# Patient Record
Sex: FEMALE | Race: WHITE
[De-identification: names, ages, dates, MRNs, and addresses within clinical notes are randomized per-mention and may not be internally consistent; named-entity substitution may affect disease eponyms.]

---

## 2017-12-28 ENCOUNTER — HOSPITAL ENCOUNTER (EMERGENCY)
Dept: HOSPITAL 17 - HOBED | Age: 22
Discharge: HOME | End: 2017-12-28
Payer: COMMERCIAL

## 2017-12-28 VITALS
TEMPERATURE: 99.2 F | OXYGEN SATURATION: 100 % | DIASTOLIC BLOOD PRESSURE: 67 MMHG | HEART RATE: 97 BPM | RESPIRATION RATE: 16 BRPM | SYSTOLIC BLOOD PRESSURE: 141 MMHG

## 2017-12-28 DIAGNOSIS — R10.30: ICD-10-CM

## 2017-12-28 DIAGNOSIS — Z3A.16: ICD-10-CM

## 2017-12-28 DIAGNOSIS — O26.892: Primary | ICD-10-CM

## 2017-12-28 PROCEDURE — 84112 EVAL AMNIOTIC FLUID PROTEIN: CPT

## 2017-12-28 PROCEDURE — 76815 OB US LIMITED FETUS(S): CPT

## 2017-12-28 NOTE — PD
HPI


Chief Complaint


Leakage of fluid and cramping abdominal pain


Date Seen:  Dec 28, 2017


Time Seen:  22:05


Travel History


International Travel<30 Days:  No


Contact w/Intl Traveler<30Days:  No


Known Affected Area:  No





History of Present Illness


HPI


Patient is 22-year-old white female  at 16 weeks who presents to the 

emergency room with complaining of loss of fluid per vagina and cramping pain.  

Denies bleeding.  Amnio sure is negative ,Fetal heart tones 140s


Weeks Gestation:  16


Para:  0


:  1





History


Social History


Alcohol Use:  No


Tobacco Use:  No


Substance Abuse:  No





Allergies-Medications


(Allergen,Severity, Reaction):  


Coded Allergies:  


     Iodinated Contrast- Oral and IV Dye (Unverified  Allergy, Intermediate, 

RASH, 17)


     caffeine (Unverified  Allergy, Intermediate, PSORIASIS / BREAKOUTS., )


     latex (Unverified  Allergy, Mild, rash, 17)


     tomato (Unverified  Adverse Reaction, Unknown, 17)


Home Meds


No Active Prescriptions or Reported Meds





Review of Systems


General / Constitutional:  No: Fever, Weight Gain, Chills, Other


Eyes:  No: Diploplia, Blurred Vision, Visual changes, Pain, Photophobia


HENT:  No: Headaches, Vertigo, Lightheadedness


Cardiovascular:  No: Irregular Rhythm, Chest Pain or Discomfort, Palpitations, 

Tachycardia, Syncope, Varicosities, Edema, Cyanosis


Respiratory:  No: Cough, Short of Breath, Other


Gastrointestinal:  No: Nausea, Vomiting, Diarrhea


Genitourinary:  No: Decreased Urinary Output, Oliguria


Musculoskeletal:  No: Limited ROM, Weakness, Cramping, Edema, Pain


Skin:  No Rash, No Itching, No Dryness, No Lumps, No Change in Pigmentation, No 

Change in Nails, No Alopecia, No Lesions


Neurologic:  No: Weakness, Dizziness, Syncope, Focal Abnormalities, 

Coordination Problem, Headache, Slurred Speech, Seizures


Psychiatric:  No: Depression, Suicidal Ideations, Homicidal Ideation


Endocrine:  No: Heat Intolerance, Cold Intolerance, Polydipsia, Polyuria, Other





Physical Exam





Vital Signs








  Date Time  Temp Pulse Resp B/P (MAP) Pulse Ox O2 Delivery O2 Flow Rate FiO2


 


17 19:35 99.2 97 16 141/67 (91) 100 Room Air  








Narrative


GENERAL: Well-nourished, well-developed patient.


SKIN: Warm and dry.


HEAD: Normocephalic and atraumatic.


EYES: No scleral icterus. No injection or drainage. 


ENT: No nasal drainage noted. Mucous membranes pink. Airway patent.


NECK: Supple, trachea midline. No JVD.


CARDIOVASCULAR: Regular rate and rhythm without murmurs, gallops, or rubs. 


RESPIRATORY: Breath sounds equal bilaterally. No accessory muscle use.


BREASTS: Bilateral exam showed no masses , no retractions, no nipple discharge.


ABDOMEN/GI: Abdomen soft, non-tender, bowel sounds present, no rebound, no 

guarding 


   Gravid to [16-] weeks size


   Fundal Height: [-below umb]


GENITOURINARY: 


   External Genitalia: intact and normal in appearance


   BUS glands: [-]


   Cervix: [-post]


   Dilatation: [0-]          


   Effacement: [0-]          


   Station: [-2]  


   Presentation: [br-]        


   Membranes: [intact  amnisure neg


   Uterine Contractions: [none-]


FHT's:140s 


     


EXTREMITIES: No cyanosis or edema.


BACK: Nontender without obvious deformity. No CVA tenderness.


NEUROLOGICAL: Awake and alert. Motor and sensory grossly within normal limits. 

Five out of 5 muscle strength in all muscle groups. Normal speech.





Data


Data


Orders





 Orders


Ob Poc Ultrasound (17 )


Labs


US - 15-16 wk IUP, + CM  , normal AFV, breech , ant placenta





MDM


Interpretation(s)


22-year-old white female  at 16 weeks who initially thought she leak some 

fluid small amount per vagina and some cramping pain.  Here on OB ED she is not 

leaking fluid her cervix is closed and high ultrasound shows normal amniotic 

fluid on and a breech fetus is 15-16 week size, her amnio sure is negative here 

on OB ED


Plan


Past discharge patient home to increase bed rest, Tylenol usage for pain, 

increase her by mouth fluids, and she is a heating pad on low for comfort if it 

would help


Diagnosis


Diagnosis:  


 Primary Impression:  


 Abdominal pain affecting pregnancy


 Additional Impressions:  


 No leakage of amniotic fluid into vagina


 16 weeks gestation of pregnancy


Disposition:  01 DISCHARGE HOME


Condition:  Stable


Scripts


No Active Prescriptions or Reported Meds





**Additional Instructions**:  


Patient is transferred to the OB ED for further evaluation.











Davion Felder II, MD Dec 28, 2017 22:16

## 2017-12-28 NOTE — PD
HPI


Chief Complaint:  Pregnancy Related Problem


Time Seen by Provider:  20:55


Travel History


International Travel<30 days:  No


Contact w/Intl Traveler<30days:  No


Traveled to known affect area:  No





History of Present Illness


HPI


22-year-old female presents to the emergency department for evaluation of lower 

abdominal cramping that started 3 days ago.  She states she was seen at [facility] yesterday and had lab work as well as UA completed.  She is 

placed on Keflex for UTI.  However, she states he never checked the baby.  

Patient states she has small leakage of fluid today.  She reports being Rh-.  

She has no vaginal bleeding.  She denies any abnormal vaginal discharge risk of 

STDs.  No fevers or chills.  Her chest pain or shortness breath.  She is a G3, 

P1 with one previous miscarriage and one live birth.  She states her last 

menstrual cycle was 2017.  She reports being approximately 16 weeks.

  Patient states [facility].  

Moderate severity.  No exacerbating or alleviating factors.





PFSH


Past Medical History


Medical History:  Denies Significant Hx


Diminished Hearing:  No


Immunizations Current:  Yes


Tetanus Vaccination:  < 5 Years


Pregnant?:  Pregnant


:  2


Para:  1


Miscarriage:  0


:  0





Past Surgical History


Other Surgery:  Yes (LAC REPAIR TO HEAD AND LEG)





Social History


Alcohol Use:  No


Tobacco Use:  No


Substance Use:  No





Allergies-Medications


(Allergen,Severity, Reaction):  


Coded Allergies:  


     Iodinated Contrast- Oral and IV Dye (Unverified  Allergy, Intermediate, 

RASH, 17)


     caffeine (Unverified  Allergy, Intermediate, PSORIASIS / BREAKOUTS., )


     latex (Unverified  Allergy, Mild, rash, 17)


     tomato (Unverified  Adverse Reaction, Unknown, 17)


Reported Meds & Prescriptions





Reported Meds & Active Scripts


Active


No Active Prescriptions or Reported Medications    








Review of Systems


Except as stated in HPI:  all other systems reviewed are Neg





Physical Exam


Narrative


GENERAL: Well-nourished, well-developed female patient, ambulatory.  Afebrile.


SKIN: Focused skin assessment warm/dry.


HEAD: Normocephalic.  Atraumatic.


EYES: No scleral icterus. No injection or drainage. 


NECK: Supple, trachea midline. No JVD or lymphadenopathy.


CARDIOVASCULAR: Regular rate and rhythm without murmurs, gallops, or rubs. 


RESPIRATORY: Breath sounds equal bilaterally. No accessory muscle use.  Lungs 

sounds are clear to auscultation.


GASTROINTESTINAL: Abdomen soft and nondistended.  She has pelvic tenderness to 

palpation.


MUSCULOSKELETAL: No cyanosis, or edema. 


BACK: Nontender without obvious deformity. No CVA tenderness.





Data


Data


Last Documented VS





Vital Signs








  Date Time  Temp Pulse Resp B/P (MAP) Pulse Ox O2 Delivery O2 Flow Rate FiO2


 


17 19:35 99.2 97 16 141/67 (91) 100 Room Air  











MDM


Medical Decision Making


Medical Screen Exam Complete:  Yes


Emergency Medical Condition:  Yes


Medical Record Reviewed:  Yes


Differential Diagnosis


Intrauterine pregnancy versus UTI versus threatened 


Narrative Course


22-year-old female presents to the emergency department for evaluation of lower 

abdominal cramping, small leakage of fluid.  She states her last menstrual 

cycle was .  This would make her 19 weeks, 6 days.  I spoke to the OB 

ED who states they will evaluate the patient.  Patient is transferred up to the 

OB ED for further evaluation.





Diagnosis





 Primary Impression:  


 Abdominal pain affecting pregnancy





***Additional Instructions:  


Patient is transferred to the OB ED for further evaluation.


Scripts


No Active Prescriptions or Reported Meds











Stephanie Atkinson Dec 28, 2017 21:10

## 2018-01-11 ENCOUNTER — HOSPITAL ENCOUNTER (EMERGENCY)
Dept: HOSPITAL 17 - HOBED | Age: 23
Discharge: HOME | End: 2018-01-11
Payer: MEDICAID

## 2018-01-11 DIAGNOSIS — N89.8: ICD-10-CM

## 2018-01-11 DIAGNOSIS — J02.9: ICD-10-CM

## 2018-01-11 DIAGNOSIS — R10.13: ICD-10-CM

## 2018-01-11 DIAGNOSIS — Z3A.18: ICD-10-CM

## 2018-01-11 DIAGNOSIS — O99.512: Primary | ICD-10-CM

## 2018-01-11 DIAGNOSIS — O26.892: ICD-10-CM

## 2018-01-11 LAB
BACTERIA #/AREA URNS HPF: (no result) /HPF
COLOR UR: (no result)
GLUCOSE UR STRIP-MCNC: (no result) MG/DL
HGB UR QL STRIP: (no result)
KETONES UR STRIP-MCNC: (no result) MG/DL
MUCOUS THREADS #/AREA URNS LPF: (no result) /LPF
NITRITE UR QL STRIP: (no result)
SP GR UR STRIP: 1.01 (ref 1–1.03)
SQUAMOUS #/AREA URNS HPF: 7 /HPF (ref 0–5)
URINE LEUKOCYTE ESTERASE: (no result)

## 2018-01-11 PROCEDURE — 99283 EMERGENCY DEPT VISIT LOW MDM: CPT

## 2018-01-11 PROCEDURE — 81001 URINALYSIS AUTO W/SCOPE: CPT

## 2018-01-11 NOTE — PD
HPI


Chief Complaint


headache, sore throat, abdominal pain, vaginal discharge


Date Seen:  2018


Travel History


International Travel<30 Days:  No


Contact w/Intl Traveler<30Days:  No


Known Affected Area:  No





History of Present Illness


HPI


Ms. Lubin is a 21 yo  at 18 weeks GA (per patient, BROOK 2018) who 

presents in the company of her boyfriend with complaint of epigastric abdominal 

pain, subjective fevers, headache, vaginal discharge/itching, and sore throat. 


Patient reports that her symptoms began 4-5 days ago, she began having a 

headache, sore throat, and subjective fevers at this time. Patient did not take 

her temperature but her boyfriend reports she felt hot; she has been taking 

Tylenol. Patient has not had significant cough. Headache is frontal and 

radiating down on her face; it is intermittent in nature. No visual changes 

associated . Patient's pain in her abdomen is epigastric; not associated with 

food. Patient's vaginal discharge has also been present for the same interval; 

patient describes it as whitish and that she also has vaginal itching. Patient 

reports last intercourse with boyfriend was ~4-5 days ago. Patient reports 

normal urination and bowel movements. Patient does not report chest pain, 

shortness of breath, or leg swelling. 


Patient reports that she has had a benign prenatal course without any known 

blood pressure or blood sugar problems. Patient has a history of uncomplicated 

UTI's previously but does not notice urinary symptoms currently. Patient sees 

[facility] for her prenatal care.


Weeks Gestation:  18


Para:  1


:  3


Miscarriage:  1





History


Past Medical History


*** Narrative Medical


Prior UTI's





Obstetric History


Obstetric History





prior full term vaginal delivery ~1.5 years ago





Past Surgical History


*** Narrative Surgical


soft tissue mass removal from foot





Family History


Family History:  Negative





Social History


*** Narrative Social History


stable at home; lives with boyfriend


Alcohol Use:  No


Tobacco Use:  No


Substance Abuse:  No





Allergies-Medications


(Allergen,Severity, Reaction):  


Coded Allergies:  


     Iodinated Contrast- Oral and IV Dye (Unverified  Allergy, Intermediate, 

RASH, 17)


     caffeine (Unverified  Allergy, Intermediate, PSORIASIS / BREAKOUTS., )


     latex (Unverified  Allergy, Mild, rash, 17)


     tomato (Unverified  Adverse Reaction, Unknown, 17)


Home Meds


Active Scripts


Terconazole Vaginal Cream (Terazol 7 Vaginal Cream) 0.4 % Cream, 1 APPL VAGINAL 

HS for Fungal Infection, #45 GM 0 Refills


   1 applicatorful intravaginally x 7 nights


   Prov:Merlin Carbajal MD, R3         18





Review of Systems


General / Constitutional:  Fever (not documented, subkective)


Eyes:  No: Blurred Vision


HENT:  No: Headaches


Cardiovascular:  No: Chest Pain or Discomfort


Respiratory:  No: Cough, Short of Breath


Gastrointestinal:  Abdominal Pain (mild epigastric), No: Nausea, Vomiting


Genitourinary:  No: Urgency, Frequency


Skin:  No Rash, No Itching


Psychiatric:  No: Anxiety, Depression





Physical Exam


/67





T98.8F


RR 18


Narrative


GENERAL: Well-nourished, well-developed patient.


SKIN: Warm and dry.


EYES: No scleral icterus. No injection or drainage. 


ENT: No nasal drainage noted. Mucous membranes pink. Airway patent.


   Posterior oropharynx clear without exudate


NECK: No lymphadenopathy or thyromegaly


CARDIOVASCULAR: Regular rate and rhythm without murmurs. Normal perfusion 


RESPIRATORY: CTAB; normal rate


EXTREMITIES: No cyanosis or edema.


NEUROLOGICAL: Awake and alert. Motor and sensory function grossly within normal 

limits.


ABDOMEN/GI: Abdomen soft, mild epigastric pain to palpation, bowel sounds 

present.  


   Gravid to ~20 weeks


GENITOURINARY: 


   External Genitalia: intact and normal in appearance


   Cervix: Closed


   Some whitish discharge suggestive of candida in vaginal vault


FHT's: 


   FHR 130bpm





MDM


Medical Record Reviewed:  Yes


Narrative Course / MDM


21 yo  at 18 weeks GA (per patient, BROOK 2018) who presents in the 

company of her boyfriend with complaint of epigastric abdominal pain, 

subjective fevers, headache, vaginal discharge/itching, and sore throat


-Normotensive; benign physical exam with exception of suspected candidal 

infection


-Normal VS; afebrile with normal BP


-


-Urine dipstick with trace leukocyte esterase but otherwise unremarkable; no 

concern for pathology





Assessment/Plan:


headache, subjective fever, and mild epigastric pain


Impression: Suspect symptoms secondary to benign viral infection. No concern 

for UTI based on normal dipstick UA. Patient's headaches seem benign and have 

been alleviated with Tylenol. Patient's sore throat appears benign; does not 

meet [facility] criteria for strep screening


-Patient counselled to rest at home with frequent fluid hydration


-Patient will continue to take Tylenol PRN


-Patient instructed to return to ED with any increasing symptoms/additional 

concerns





Vaginal itching/discharge


Impression: Suspicious for candidal infection by history and on physical exam


-Patient given script for Terazol 7; she will return with non-remitting symptoms





Discussed with Dr. Felder


Diagnosis


Diagnosis:  


 Primary Impression:  


 Sore throat


 Additional Impressions:  


 Epigastric abdominal pain


 Vaginal itching


Disposition:   DISCHARGE HOME


Condition:  Stable


Scripts


Terconazole Vaginal Cream (Terazol 7 Vaginal Cream) 0.4 % Cream


1 APPL VAGINAL HS for Fungal Infection, #45 GM 0 Refills


   1 applicatorful intravaginally x 7 nights


   Prov: Merlin Carbajal MD, R3         18


Referrals:  


OBGYN


1 week


Patient Instructions:  Abdominal Pain in Pregnancy (ED), General Instructions











Merlin Carbajal MD, R3 2018 14:54

## 2018-02-16 ENCOUNTER — HOSPITAL ENCOUNTER (EMERGENCY)
Dept: HOSPITAL 17 - HOBED | Age: 23
Discharge: HOME | End: 2018-02-16
Payer: MEDICAID

## 2018-02-16 DIAGNOSIS — R10.30: ICD-10-CM

## 2018-02-16 DIAGNOSIS — O26.892: Primary | ICD-10-CM

## 2018-02-16 DIAGNOSIS — Z3A.23: ICD-10-CM

## 2018-02-16 PROCEDURE — 99283 EMERGENCY DEPT VISIT LOW MDM: CPT

## 2018-02-16 NOTE — PD
HPI


Chief Complaint


Abdominal pain just today


Date Seen:  2018


Time Seen:  23:20


Travel History


International Travel<30 Days:  No


Contact w/Intl Traveler<30Days:  No


Known Affected Area:  No





History of Present Illness


HPI


Patient is 22-year-old white female  23 weeks who gets her prenatal care 

at the [facility] and presents complaining of abdominal pain 

generally all around her abdomen today only, the pain comes and goes, no 

bleeding or leakage of fluid, baby is active, fetal heart rate tracing is 

within normal limits at 23 weeks and there are no contractions


Weeks Gestation:  23


Para:  1


:  3





History


Obstetric History


Obstetric History


One vaginal delivery 1 early AB





Social History


Alcohol Use:  No


Tobacco Use:  No


Substance Abuse:  No





Allergies-Medications


(Allergen,Severity, Reaction):  


Coded Allergies:  


     Iodinated Contrast- Oral and IV Dye (Unverified  Allergy, Intermediate, 

RASH, 17)


     caffeine (Unverified  Allergy, Intermediate, PSORIASIS / BREAKOUTS., )


     latex (Unverified  Allergy, Mild, rash, 17)


     tomato (Unverified  Adverse Reaction, Unknown, 17)


Home Meds


Active Scripts


Terconazole Vaginal Cream (Terazol 7 Vaginal Cream) 0.4 % Cream, 1 APPL VAGINAL 

HS for Fungal Infection, #45 GM 0 Refills


   1 applicatorful intravaginally x 7 nights


   Prov:Merlin Carbajal MD, R3         18





Review of Systems


General / Constitutional:  No: Fever, Weight Gain, Chills, Other


Eyes:  No: Diploplia, Blurred Vision, Visual changes, Pain, Photophobia


HENT:  No: Headaches, Vertigo, Lightheadedness


Cardiovascular:  No: Irregular Rhythm, Chest Pain or Discomfort, Palpitations, 

Tachycardia, Syncope, Varicosities, Edema, Cyanosis


Respiratory:  No: Cough, Short of Breath, Other


Gastrointestinal:  Abdominal Pain, No: Nausea, Vomiting, Diarrhea


Genitourinary:  No: Decreased Urinary Output, Oliguria


Musculoskeletal:  No: Limited ROM, Weakness, Cramping, Edema, Pain


Skin:  No Rash, No Itching, No Dryness, No Lumps, No Change in Pigmentation, No 

Change in Nails, No Alopecia, No Lesions


Neurologic:  No: Weakness, Dizziness, Syncope, Focal Abnormalities, 

Coordination Problem, Headache, Slurred Speech, Seizures


Psychiatric:  No: Depression, Suicidal Ideations, Homicidal Ideation


Endocrine:  No: Heat Intolerance, Cold Intolerance, Polydipsia, Polyuria, Other





Physical Exam


Narrative


GENERAL: Well-nourished, well-developed patient.


SKIN: Warm and dry.


HEAD: Normocephalic and atraumatic.


EYES: No scleral icterus. No injection or drainage. 


ENT: No nasal drainage noted. Mucous membranes pink. Airway patent.


NECK: Supple, trachea midline. No JVD.


CARDIOVASCULAR: Regular rate and rhythm without murmurs, gallops, or rubs. 


RESPIRATORY: Breath sounds equal bilaterally. No accessory muscle use.


BREASTS: Bilateral exam showed no masses , no retractions, no nipple discharge.


ABDOMEN/GI: Abdomen soft, non-tender, bowel sounds present, no rebound, no 

guarding 


   Gravid to [-23] weeks size


   Fundal Height: [-23]


GENITOURINARY: 


   External Genitalia: intact and normal in appearance


   BUS glands: [-]


   Cervix: [post-]


   Dilatation: [-closed]          


   Effacement: [thick-]          


   Station: [-3]  


   Presentation: [-]        


   Membranes: [intact ]


   Uterine Contractions: [none-]


FHT's: 


   Category: [-1]   


   Baseline: [-122]   


   Reactive: [R-]   


   Variability: [-mod]  


   Decels: [none-]  


EXTREMITIES: No cyanosis or edema.


BACK: Nontender without obvious deformity. No CVA tenderness.


NEUROLOGICAL: Awake and alert. Motor and sensory grossly within normal limits. 

Five out of 5 muscle strength in all muscle groups. Normal speech.





Data


Data


Labs


Urine dip on OB ED was negative





MDM


Interpretation(s)


Patient is 22-year-old white female  at 23 weeks presents complaining of 

lower abdominal pain for a day there is a no contraction activity noted cervix 

is closed thick and posterior, urinalysis is negative, that the leaves the pain 

is very likely related to ligament strain or soft tissue exertion.  She was 

encouraged to increase bed rest over the next 1-2 days, Tylenol use liberally 

for pain, increase fluid intake for hydration purposes, and a heating pad or 

hot bath for symptom relief.


Plan


Plan to discharge patient home to bedrest and other measures to and above for 

relief follow-up with her OB provider


Diagnosis


Diagnosis:  


 Primary Impression:  


 Abdominal pain affecting pregnancy


 Additional Impression:  


 23 weeks gestation of pregnancy


Disposition:   DISCHARGE HOME


Condition:  Stable











Davion Felder II, MD 2018 23:28

## 2018-05-01 ENCOUNTER — HOSPITAL ENCOUNTER (EMERGENCY)
Dept: HOSPITAL 17 - HOBED | Age: 23
Discharge: HOME | End: 2018-05-01
Payer: MEDICAID

## 2018-05-01 DIAGNOSIS — Z3A.33: ICD-10-CM

## 2018-05-01 DIAGNOSIS — O26.893: Primary | ICD-10-CM

## 2018-05-01 DIAGNOSIS — R10.9: ICD-10-CM

## 2018-05-01 LAB
ANION GAP: 9 MEQ/L (ref 5–15)
AUTOMATED NEUTROPHIL #: 8.3 TH/MM3 (ref 1.8–7.7)
BASOPHIL #: 0 TH/MM3 (ref 0–0.2)
BASOPHIL %: 0.2 % (ref 0–2)
BICARBONATE: 22.9 MEQ/L (ref 21–32)
BLOOD UREA NITROGEN: 3 MG/DL (ref 7–18)
BLOOD, URINE: (no result)
CALCIUM: 9.3 MG/DL (ref 8.5–10.1)
CHLORIDE: 106 MEQ/L (ref 98–107)
COMMENT (UR): (no result)
CREATININE: 0.65 MG/DL (ref 0.5–1)
CULTURE IF INDICATED: (no result)
EOSINOPHIL #: 0 TH/MM3 (ref 0–0.4)
EOSINOPHIL %: 0.1 % (ref 0–4)
GLOMERULAR FILTRATION RATE: 116 ML/MIN (ref 89–?)
GLUCOSE,RANDOM: 76 MG/DL (ref 74–106)
GLUCOSE,URINE: (no result) MG/DL
HEMATOCRIT: 31.2 % (ref 35–46)
HEMO FLAGS: (no result)
HEMOGLOBIN: 10.8 GM/DL (ref 11.6–15.3)
KETONE, URINE: (no result) MG/DL
LYMPH %: 5.9 % (ref 9–44)
LYMPHOCYTE #: 0.5 TH/MM3 (ref 1–4.8)
MEAN CELL VOLUME: 83.1 FL (ref 80–100)
MEAN CORPUSCULAR HEMOGLOBIN: 28.8 PG (ref 27–34)
MEAN CORPUSCULAR HGB CONC: 34.6 % (ref 32–36)
MEAN PLATELET VOLUME: 9.3 FL (ref 7–11)
MONO %: 3.4 % (ref 0–8)
MONOCYTE #: 0.3 TH/MM3 (ref 0–0.9)
MUCUS URINE: (no result) /LPF
NEUT %: 90.4 % (ref 16–70)
NITRITE,URINE: (no result)
PLATELET COUNT: 116 TH/MM3 (ref 150–450)
POTASSIUM: 3.7 MEQ/L (ref 3.5–5.1)
RED BLOOD COUNT: 3.75 MIL/MM3 (ref 4–5.3)
RED CELL DISTRIBUTION WIDTH: 14.9 % (ref 11.6–17.2)
SODIUM (NA): 138 MEQ/L (ref 136–145)
SPECIFIC GRAVITY,URINE: 1.01 (ref 1–1.03)
SQUAMOUS EPITHELIAL CELL URINE: 20 /HPF (ref 0–5)
URINE COLOR: YELLOW
URINE LEUKOCYTE ESTERASE: (no result)
WHITE BLOOD COUNT: 9.2 TH/MM3 (ref 4–11)

## 2018-05-01 PROCEDURE — 85025 COMPLETE CBC W/AUTO DIFF WBC: CPT

## 2018-05-01 PROCEDURE — 99284 EMERGENCY DEPT VISIT MOD MDM: CPT

## 2018-05-01 PROCEDURE — 81001 URINALYSIS AUTO W/SCOPE: CPT

## 2018-05-01 PROCEDURE — 80048 BASIC METABOLIC PNL TOTAL CA: CPT

## 2018-05-01 RX ADMIN — ACETAMINOPHEN 1 MG: 325 TABLET ORAL at 14:47

## 2018-05-01 RX ADMIN — OXYTOCIN 1 MLS/HR: 10 INJECTION, SOLUTION INTRAMUSCULAR; INTRAVENOUS at 14:47

## 2018-05-10 ENCOUNTER — HOSPITAL ENCOUNTER (OUTPATIENT)
Dept: HOSPITAL 17 - CDED | Age: 23
End: 2018-05-10
Attending: OBSTETRICS & GYNECOLOGY
Payer: MEDICAID

## 2018-05-10 DIAGNOSIS — O24.419: Primary | ICD-10-CM

## 2018-05-10 PROCEDURE — 97802 MEDICAL NUTRITION INDIV IN: CPT

## 2018-05-18 ENCOUNTER — HOSPITAL ENCOUNTER (EMERGENCY)
Age: 23
Discharge: HOME | End: 2018-05-18

## 2018-05-18 DIAGNOSIS — Z3A.36: ICD-10-CM

## 2018-05-18 DIAGNOSIS — O24.415: ICD-10-CM

## 2018-05-18 DIAGNOSIS — J02.9: ICD-10-CM

## 2018-05-18 DIAGNOSIS — O36.8130: Primary | ICD-10-CM

## 2018-05-18 DIAGNOSIS — O99.513: ICD-10-CM

## 2018-06-02 ENCOUNTER — HOSPITAL ENCOUNTER (EMERGENCY)
Dept: HOSPITAL 17 - HOBED | Age: 23
LOS: 1 days | Discharge: HOME | End: 2018-06-03
Payer: MEDICAID

## 2018-06-02 DIAGNOSIS — O47.1: Primary | ICD-10-CM

## 2018-06-02 DIAGNOSIS — O24.414: ICD-10-CM

## 2018-06-02 DIAGNOSIS — Z3A.38: ICD-10-CM

## 2018-06-02 PROCEDURE — 59025 FETAL NON-STRESS TEST: CPT

## 2018-06-03 NOTE — PD
HPI


Chief Complaint


ctxs


Date Seen:  2018


Time Seen:  23:55


Travel History


International Travel<30 Days:  No


Contact w/Intl Traveler<30Days:  No


Known Affected Area:  No





History of Present Illness


HPI


pt. is a 23  @ 382/7 weeks present w/ c/o ctxs.  pt. w/ gdmA2 on 

insulin.  pt. states ctxs thruout the day have increased in intensity and freq 

thruout the day.  +FM, no lof/vb.  pt. cervix on present 3/80/-2, w/ no change 

over monitoring period.


Weeks Gestation:  38


Para:  1


:  3


Miscarriage:  1





History


Past Medical History


Medical History:  Denies Significant Hx





Obstetric History


Obstetric History


,  x 1, GDMA2 on insulin.





Past Surgical History


Surgical History:  No Previous Surgery





Social History


Alcohol Use:  No


Tobacco Use:  No


Substance Abuse:  No





Allergies-Medications


(Allergen,Severity, Reaction):  


Coded Allergies:  


     Iodinated Contrast- Oral and IV Dye (Verified  Allergy, Intermediate, RASH

, 18)


     caffeine (Verified  Allergy, Intermediate, PSORIASIS / BREAKOUTS., 18)


     latex (Verified  Allergy, Mild, rash, 18)


     tomato (Verified  Adverse Reaction, Unknown, 18)


Home Meds


Active Scripts


Terconazole Vaginal Cream (Terazol 7 Vaginal Cream) 0.4 % Cream, 1 APPL VAGINAL 

HS for Fungal Infection, #45 GM 0 Refills


   1 applicatorful intravaginally x 7 nights


   Prov:Merlin Carbajal MD R3         18





Review of Systems


Except as stated in HPI:  all other systems reviewed are Neg





Physical Exam


Narrative


GENERAL: Well-nourished, well-developed patient.


SKIN: Warm and dry.


HEAD: Normocephalic and atraumatic.


EYES: No scleral icterus. No injection or drainage. 


ENT: No nasal drainage noted. Mucous membranes pink. Airway patent.


NECK: Supple, trachea midline. No JVD.


CARDIOVASCULAR: Regular rate and rhythm without murmurs, gallops, or rubs. 


RESPIRATORY: Breath sounds equal bilaterally. No accessory muscle use.


BREASTS: Bilateral exam showed no masses , no retractions, no nipple discharge.


ABDOMEN/GI: Abdomen soft, non-tender, bowel sounds present, no rebound, no 

guarding 


   Gravid 


GENITOURINARY: 


   External Genitalia: intact and normal in appearance


   Dilatation: 3      


   Effacement: 80         


   Station: -2        


   Uterine Contractions: irreg


FHT's: 


   Category: 1   


   Reactive: +   


   Variability: mod   


EXTREMITIES: No cyanosis or edema.


BACK: Nontender without obvious deformity. No CVA tenderness.


NEUROLOGICAL: Awake and alert. Motor and sensory grossly within normal limits. 

Five out of 5 muscle strength in all muscle groups. Normal speech.





Data


Data


Vital Signs Reviewed:  Yes


Orders





 Orders


Ob/Gyn Clear For Discharge (18 )





[facility]


Medical Record Reviewed:  Yes


Plan


pt. not in labor.  pt. to be d/c to home.  given precautions for return.  f/u 

as sched.  all ? answered.


Diagnosis


Diagnosis:  


 Primary Impression:  


 False labor after 37 completed weeks of gestation


 Additional Impressions:  


 38 weeks gestation of pregnancy


 Gestational diabetes mellitus (GDM)


Disposition:  01 DISCHARGE HOME











Hever Lawler Jr., MD Michael 3, 2018 00:00

## 2018-06-07 ENCOUNTER — HOSPITAL ENCOUNTER (INPATIENT)
Dept: HOSPITAL 17 - H2EA | Age: 23
LOS: 2 days | Discharge: HOME | End: 2018-06-09
Attending: OBSTETRICS & GYNECOLOGY | Admitting: OBSTETRICS & GYNECOLOGY
Payer: MEDICAID

## 2018-06-07 VITALS — HEIGHT: 61 IN | WEIGHT: 194.01 LBS | BODY MASS INDEX: 36.63 KG/M2

## 2018-06-07 DIAGNOSIS — F12.90: ICD-10-CM

## 2018-06-07 DIAGNOSIS — Z67.91: ICD-10-CM

## 2018-06-07 DIAGNOSIS — Z3A.39: ICD-10-CM

## 2018-06-07 DIAGNOSIS — Z91.040: ICD-10-CM

## 2018-06-07 DIAGNOSIS — Z91.041: ICD-10-CM

## 2018-06-07 DIAGNOSIS — Z87.891: ICD-10-CM

## 2018-06-07 DIAGNOSIS — O44.40: ICD-10-CM

## 2018-06-07 DIAGNOSIS — O99.320: ICD-10-CM

## 2018-06-07 DIAGNOSIS — O26.893: ICD-10-CM

## 2018-06-07 LAB
BACTERIA #/AREA URNS HPF: (no result) /HPF
BASOPHILS # BLD AUTO: 0 TH/MM3 (ref 0–0.2)
BASOPHILS NFR BLD: 0.2 % (ref 0–2)
COLOR UR: YELLOW
EOSINOPHIL # BLD: 0.1 TH/MM3 (ref 0–0.4)
EOSINOPHIL NFR BLD: 0.6 % (ref 0–4)
ERYTHROCYTE [DISTWIDTH] IN BLOOD BY AUTOMATED COUNT: 15.3 % (ref 11.6–17.2)
GLUCOSE UR STRIP-MCNC: (no result) MG/DL
HCT VFR BLD CALC: 36.6 % (ref 35–46)
HGB BLD-MCNC: 12.2 GM/DL (ref 11.6–15.3)
HGB UR QL STRIP: (no result)
KETONES UR STRIP-MCNC: (no result) MG/DL
LYMPHOCYTES # BLD AUTO: 2.1 TH/MM3 (ref 1–4.8)
LYMPHOCYTES NFR BLD AUTO: 17.4 % (ref 9–44)
MCH RBC QN AUTO: 28.3 PG (ref 27–34)
MCHC RBC AUTO-ENTMCNC: 33.3 % (ref 32–36)
MCV RBC AUTO: 84.9 FL (ref 80–100)
MONOCYTE #: 0.5 TH/MM3 (ref 0–0.9)
MONOCYTES NFR BLD: 4.5 % (ref 0–8)
MUCOUS THREADS #/AREA URNS LPF: (no result) /LPF
NEUTROPHILS # BLD AUTO: 9.3 TH/MM3 (ref 1.8–7.7)
NEUTROPHILS NFR BLD AUTO: 77.3 % (ref 16–70)
NITRITE UR QL STRIP: (no result)
PLATELET # BLD: 173 TH/MM3 (ref 150–450)
PMV BLD AUTO: 10 FL (ref 7–11)
RBC # BLD AUTO: 4.31 MIL/MM3 (ref 4–5.3)
SP GR UR STRIP: 1.01 (ref 1–1.03)
SQUAMOUS #/AREA URNS HPF: 6 /HPF (ref 0–5)
URINE LEUKOCYTE ESTERASE: (no result)
WBC # BLD AUTO: 12 TH/MM3 (ref 4–11)

## 2018-06-07 PROCEDURE — 59025 FETAL NON-STRESS TEST: CPT

## 2018-06-07 PROCEDURE — 86850 RBC ANTIBODY SCREEN: CPT

## 2018-06-07 PROCEDURE — 85461 HEMOGLOBIN FETAL: CPT

## 2018-06-07 PROCEDURE — 82948 REAGENT STRIP/BLOOD GLUCOSE: CPT

## 2018-06-07 PROCEDURE — 90384 RH IG FULL-DOSE IM: CPT

## 2018-06-07 PROCEDURE — 10907ZC DRAINAGE OF AMNIOTIC FLUID, THERAPEUTIC FROM PRODUCTS OF CONCEPTION, VIA NATURAL OR ARTIFICIAL OPENING: ICD-10-PCS | Performed by: OBSTETRICS & GYNECOLOGY

## 2018-06-07 PROCEDURE — 85025 COMPLETE CBC W/AUTO DIFF WBC: CPT

## 2018-06-07 PROCEDURE — G0481 DRUG TEST DEF 8-14 CLASSES: HCPCS

## 2018-06-07 PROCEDURE — 80307 DRUG TEST PRSMV CHEM ANLYZR: CPT

## 2018-06-07 PROCEDURE — 3E0P7VZ INTRODUCTION OF HORMONE INTO FEMALE REPRODUCTIVE, VIA NATURAL OR ARTIFICIAL OPENING: ICD-10-PCS | Performed by: OBSTETRICS & GYNECOLOGY

## 2018-06-07 PROCEDURE — 86900 BLOOD TYPING SEROLOGIC ABO: CPT

## 2018-06-07 PROCEDURE — 86901 BLOOD TYPING SEROLOGIC RH(D): CPT

## 2018-06-07 PROCEDURE — 81001 URINALYSIS AUTO W/SCOPE: CPT

## 2018-06-07 RX ADMIN — Medication SCH ML: at 21:00

## 2018-06-07 RX ADMIN — IBUPROFEN PRN MG: 800 TABLET, FILM COATED ORAL at 20:54

## 2018-06-07 RX ADMIN — OXYCODONE HYDROCHLORIDE AND ACETAMINOPHEN PRN TAB: 5; 325 TABLET ORAL at 23:35

## 2018-06-07 RX ADMIN — STANDARDIZED SENNA CONCENTRATE AND DOCUSATE SODIUM PRN TAB: 8.6; 5 TABLET, FILM COATED ORAL at 20:54

## 2018-06-07 NOTE — HHI.PR
OB/GYN Note


Note


S: Doing well, having painful contractions, no vaginal bleeding or leakage of 

fluid, endorses fetal movement.





O: 





Exam: Cervix 4 cm/70% effaced/-2/midposition.  Artificially ruptured, clear 

fluid, cephalic by exam


FHTs: 120s, moderate variability, accelerations present, no decelerations


TOCO: Contractions every 4-7 minutes,





A/P





22 yo  at 30w0d by 12w US (BROOK 18)





1. IUP: Cat 1 tracing


- cephalic by exam


- GBS neg, EFW  () = 2953g (32%) AC 11% 





2. Augmentation of labor: pt was scheduled for IOL today but on arrival 

vickie regularly and BS of 8, artificially ruptured, allow expectant 

management at this time, will begin Pitocin if no change at next check.. 





2. GDMA2: Goal BS , BS q2hr in latent then q1hr in active labor, may need 

insulin drip. 


-Medium dose sliding scale at this time


- Home insulin NPH 29/10, Reg 10/0/10.  Patient will be on clear liquids, hold 

regular insulin, use half of NPH.





3. Rh neg: s/p Rhogam on 18. Rhogam PP per protocol





5. H/o low lying placenta: resolved on  scan





6. GBS positive: Begin PCN for ppx.











Peña Chappell MD 2018 08:04

## 2018-06-07 NOTE — MH
cc:

Peña Chappell MD

****

 

 

DATE OF ADMISSION:

2018

 

CHIEF COMPLAINT:

Admission for induction of labor.

 

HISTORY OF PRESENT ILLNESS:

This patient is a 23-year-old, , at 39 weeks and 0 days by 12-week 

ultrasound with estimated due date of 2018 here for admission of

labor secondary to her gestational diabetes.  Any changes to her HPI 

will be added as an addendum at time of her admission.  Her pregnancy 

has been complicated by first trimester drug screen positive for 

marijuana, Rh negative status, low lying placenta, which has resolved,

and gestational diabetes A2 controlled on insulin.

 

PAST MEDICAL HISTORY:

Prior tobacco use.

 

MEDICATIONS:

1.  Prenatal vitamins

2.  Insulin NPH 29 units in the morning, 10 units at night.

3.  Regular insulin 10 units in the morning with breakfast, 0 units 

with lunch and 10 units with dinner.

 

ALLERGIES:

LATEX, IV CONTRAST AND CAFFEINE

 

SURGERIES:

Left ankle.

 

SOCIAL HISTORY:

Endorses tobacco use prior to pregnancy.  Denies marijuana use or drug

use.  She is engaged to her [employer] garg.

 

GYN HISTORY:

LMP 2017.  STDs the patient denies.

 

OBSTETRIC HISTORY:

1.  G3, P1-0-1-1, 2015 37-week vaginal delivery, 5 pounds 11 

ounces, delivered in [place] with spontaneous labor.  No 

complications.

2.  2017, first trimester missed .

 

PHYSICAL EXAMINATION:

Physical exam done on 2018.

VITAL SIGNS:  Weight 196 pounds, blood pressure 120/80; any changes 

will be added as an addendum at time

of her presentation.

GENERAL:  Alert, oriented x3, resting comfortably, in no acute 

distress.

CARDIAC:  Regular rate and rhythm.  No murmurs, rubs or gallops.

PULMONARY:  Lungs are clear to auscultation bilaterally.  No wheezes, 

crackles or rhonchi.

ABDOMEN:  Soft, gravid, nontender, nondistended.

GENITOURINARY:  Normal external female genitalia.  Cervix closed, 

thick and high.

EXTREMITIES:  No clubbing, cyanosis or edema.

 

Fetal heart tone 142 via ultrasound.

 

Prenatal labs, 10/30/2017:  Blood type O negative, antibody negative. 

Hemoglobin 13.6.  Varicella immune, rubella immune, VDRL nonreactive. 

Urine culture negative.  Hepatitis B surface antigen negative, HIV 

negative, sickle cell screen negative.  Urine drug screen positive for

marijuana.  TSH 1.440.  Gonorrhea and chlamydia negative.  Repeat 

blood work 2018:  Hemoglobin 10.9.  One-hour Glucola 165; 3-hour

Glucola 95, 185, 164, 132.  Repeat hepatitis B surface antibody 

antigen negative.  She is group B strep positive.

 

ASSESSMENT AND PLAN:

This patient is a 23-year-old G3, P1-0-1-1 at 39 weeks and 0 days by 

12-week ultrasound, estimated due date of 2018, here for 

induction of labor secondary to gestational diabetes.



1.  Intrauterine pregnancy. We will place on fetal monitoring upon her

presentation.

Cephalic by ultrasound on 2018.  GBS positive.  Estimated fetal 

weight 7-1/2 pounds.  Female fetus, anterior placenta, estimated fetal

weight on 2018 2317 grams 45th percentile, abdominal 

circumference 30th percentile, repeat growth will be done on 

2018, will be updated.



2.  Induction of labor.  Bobo score unfavorable. We will begin with 

misoprostol at time of presentation 25 mcg PV until favorable. 



3. GBS positive status.  Antibiotics in labor for prophylaxis will be 

ordered.



3.  Rh negative.  Patient received RhoGAM on 2018.



4.  History of low-lying placenta resolved on 2018 scan.



5.  Gestational diabetes, A2.  Patient controlled on home insulin of 

NPH 29 units in the morning, 10 units at night and regular 10/0/10, we

will adjust this as needed, likely continue NPH while admitted.  

Goal BS , may need sliding scale or insulin drip if needed for tighter 
control.



6.  History of marijuana positive drug screen.  We will reassess upon 

her presentation.

 

 

__________________________________

MD STEFANIE Pan//lizandro

D: 2018, 06:54 PM

T: 2018, 07:28 PM

Visit #: [id number]

Job #: [id number]

[email or web address]

## 2018-06-07 NOTE — HHI.DCPOC
Discharge Care Plan


Diagnosis:  


(1) Group beta Strep positive


(2) Rh negative status during pregnancy


(3) GDM, class A2


(4) 37 weeks gestation of pregnancy








Your Health Problems Are: Vaginal delivery








Report Symptoms to Your Doctor


-Temperature above 100.5 degrees


-Redness, of incision or excessive or foul smelling drainage


-Unusual pain or calf pain


-Increased vaginal bleeding


-Painful or difficulty urinating


-Feelings of extreme sadness or anxiety after 2 weeks


Goals to Promote Your Health


* To prevent worsening of your condition and complications


* To maintain your health at the optimal level


Directions to Meet Your Goals


*** Take your medications as prescribed


*** Follow your dietary instruction


*** Follow activity as directed


*** Ensure plenty of rest for recovery


*** Drink fluids for hydration








*** Keep your appointments as scheduled


*** Take your immunizations and boosters as scheduled


*** If your symptoms worsen call your PCP, if no PCP go to Urgent Care Center 

or Emergency Room***


*** Smoking is Dangerous to Your Health. Avoid second hand smoke***


***Call the 24-hour crisis hotline for domestic abuse at 1-461.388.4294***











Peña Chappell MD Jun 7, 2018 16:14

## 2018-06-07 NOTE — PD.OB.DELI
Weeks gestation:  37 (37w1d)


Pt started active labor?:  No


Medical induction of labor?:  Yes


Artificial rupture of membrane:  Yes


Anesthesia:  Epidural


Episiotomy:  None


Vaginal Delivery:  Normal


Presentation:  Occiput anterior, Vertex


Nuchal Cord:  None


Delayed cord clamping (45 sec):  Yes


Shoulder Dystocia:  Shari maneuver done, Other (slight dystocia, resolved 

with  shari only)


Delivery date:  2018


Delivery time:  15:55


One Minute APGAR:  9


Five Minute APGAR:  9


Placenta:  Spontaneous delivery, Intact, 3 vessel cord (to disposal), Other (

true knot in cord)


Laceration:  No lacerations


Estimated blood loss:  300


Additional Information


Diagnoses


1. Intrauterine pregnancy at 37 weeks and 1 day


2.  GBS positive status


3.  GDM A2


4.  Rh- status





Antibiotics: Penicillin for GBS prophylaxis





Specimens: Placenta to disposal





Complications: None





Counts correct: X2





Indications: This patient is a 23-year-old G3 now  who presented for 

induction of labor secondary to her GDM A2, her blood sugar was optimized 

throughout her labor and delivery course and progressed to complete with 

minimal augmentation, I was called to the room when she was complete and ready 

to deliver in the bed was broken down.





The patient began pushing after the bed was broken down, the head upon  

was allowed to restitute naturally, there was a minimal dystocia that was 

relieved with simple Shari and no suprapubic pressure required for delivery

, with a supported perineum, and gentle downward guidance the anterior shoulder 

was delivered followed by gentle upper guidance for the posterior shoulder, the 

torso and lower extremities were delivered with ease and with continued 

perineal support. The infant had spontaneous cry and was placed on mom's 

abdomen for skin to skin contact, we allowed delayed cord clamping. After the 

cord was clamped and cut, cord blood was obtained. Pitocin was bolused and with 

fundal massage the placenta was delivered, there is minimal uterine bleeding 

and uterus was firm. The perineum, vagina and cervix were inspected and found 

to be intact. The patient tolerated the procedure well and was left in the 

birthing suite with her .











Peña Chappell MD 2018 16:12

## 2018-06-08 RX ADMIN — OXYCODONE HYDROCHLORIDE AND ACETAMINOPHEN PRN TAB: 5; 325 TABLET ORAL at 15:27

## 2018-06-08 RX ADMIN — IBUPROFEN PRN MG: 800 TABLET, FILM COATED ORAL at 09:19

## 2018-06-08 RX ADMIN — IBUPROFEN PRN MG: 800 TABLET, FILM COATED ORAL at 18:19

## 2018-06-08 RX ADMIN — OXYCODONE HYDROCHLORIDE AND ACETAMINOPHEN PRN TAB: 5; 325 TABLET ORAL at 09:21

## 2018-06-08 NOTE — HHI.OB
Subjective


Post Partum Day:  1


Remarks


Mom very flat affect


no questions asked


difficult to engage


doesn't want to breast feed  "I tried"





Objective


Objective Remarks


GENERAL: Well-nourished, well-developed patient.


CARDIOVASCULAR: Regular rate and rhythm without murmurs, gallops, or rubs. 


RESPIRATORY: Breath sounds equal bilaterally. No accessory muscle use.


ABDOMEN/GI: Abdomen soft, non-tender. 


   Fundus: Firm, non-tender at umbilicus.


GENITOURINARY: Light to moderate bleeding.


EXTREMITIES: No cyanosis or edema, non-tender, without signs of DVT.


Medications and IVs





Current Medications








 Medications


  (Trade)  Dose


 Ordered  Sig/Moe


 Route  Start Time


 Stop Time Status Last Admin


 


  (Bicitra Liq)  30 ml  ON  CALL


 PO  6/7/18 06:15


 6/10/18 06:14   


 


 


  (Misc Nursing


 Information)  No systemic


 narcotics to be


 given except...  UNSCH  PRN


 .XX  6/7/18 15:45


 6/8/18 15:44   


 


 


  (Misc Nursing


 Information)  DO NOT


 ADMINISTER ANY


 ANTICOAGUL...  UNSCH  PRN


 .XX  6/7/18 15:45


 6/8/18 15:44   


 


 


  (ePHEDrine/NS 25


 MG/5 ML SYR)  10 mg  UNSCH  PRN


 IV PUSH  6/7/18 15:45


 6/8/18 15:44   


 


 


  (NS Flush)  2 ml  BID


 IV FLUSH  6/7/18 21:00


    6/7/18 21:00


 


 


  (NS Flush)  2 ml  UNSCH  PRN


 IV FLUSH  6/7/18 16:15


     


 


 


  (Tylenol)  650 mg  Q4H  PRN


 PO  6/7/18 16:15


     


 


 


  (Motrin)  800 mg  Q8H  PRN


 PO  6/7/18 16:15


    6/7/18 20:54


 


 


  (Percocet  5-325


 Mg)  1 tab  Q4H  PRN


 PO  6/7/18 16:15


    6/7/18 23:35


 


 


  (Percocet  5-325


 Mg)  2 tab  Q4H  PRN


 PO  6/7/18 16:15


     


 


 


  (Americaine 20%


 Top Spr)  1 spray  Q4H  PRN


 TOPICAL  6/7/18 16:15


    6/7/18 20:54


 


 


  (Tucks Pads)  1 applic  QID  PRN


 TOPICAL  6/7/18 16:15


    6/7/18 20:54


 


 


  (Jennifer-Colace)  2 tab  Q12H  PRN


 PO  6/7/18 16:15


    6/7/18 20:54


 


 


  (Ambien)  5 mg  HS  PRN


 PO  6/7/18 21:00


     


 


 


  (Mag-Al Plus


 Susp Liq)  15 ml  Q8H  PRN


 PO  6/7/18 16:15


     


 


 


  (Zofran  Odt)  4 mg  Q6H  PRN


 PO  6/7/18 16:15


     


 











Assessment/Plan


Assessment and Plan


6/8/18


PPD 1


watch for Post partum depression











Trish Moreno MD Jun 8, 2018 08:56

## 2018-06-09 RX ADMIN — OXYCODONE HYDROCHLORIDE AND ACETAMINOPHEN PRN TAB: 5; 325 TABLET ORAL at 00:03

## 2018-06-09 RX ADMIN — OXYCODONE HYDROCHLORIDE AND ACETAMINOPHEN PRN TAB: 5; 325 TABLET ORAL at 06:03

## 2018-06-09 RX ADMIN — IBUPROFEN PRN MG: 800 TABLET, FILM COATED ORAL at 15:10

## 2018-06-09 RX ADMIN — IBUPROFEN PRN MG: 800 TABLET, FILM COATED ORAL at 03:34

## 2018-06-09 RX ADMIN — STANDARDIZED SENNA CONCENTRATE AND DOCUSATE SODIUM PRN TAB: 8.6; 5 TABLET, FILM COATED ORAL at 00:03

## 2018-06-09 RX ADMIN — Medication SCH ML: at 09:00

## 2018-06-09 RX ADMIN — STANDARDIZED SENNA CONCENTRATE AND DOCUSATE SODIUM PRN TAB: 8.6; 5 TABLET, FILM COATED ORAL at 15:09

## 2018-06-09 RX ADMIN — OXYCODONE HYDROCHLORIDE AND ACETAMINOPHEN PRN TAB: 5; 325 TABLET ORAL at 15:10

## 2018-06-09 NOTE — HHI.OB
Subjective


Post Partum Day:  2


Remarks


slightly better mood today


no complaints


bottling





Objective


Objective Remarks


GENERAL: Well-nourished, well-developed patient.


CARDIOVASCULAR: Regular rate and rhythm without murmurs, gallops, or rubs. 


RESPIRATORY: Breath sounds equal bilaterally. No accessory muscle use.


ABDOMEN/GI: Abdomen soft, non-tender. 


   Fundus: Firm, non-tender at umbilicus.


GENITOURINARY: Light to moderate bleeding.


EXTREMITIES: No cyanosis or edema, non-tender, without signs of DVT.


Medications and IVs





Current Medications








 Medications


  (Trade)  Dose


 Ordered  Sig/Moe


 Route  Start Time


 Stop Time Status Last Admin


 


  (Bicitra Liq)  30 ml  ON  CALL


 PO  6/7/18 06:15


 6/10/18 06:14   


 


 


  (NS Flush)  2 ml  BID


 IV FLUSH  6/7/18 21:00


    6/7/18 21:00


 


 


  (NS Flush)  2 ml  UNSCH  PRN


 IV FLUSH  6/7/18 16:15


     


 


 


  (Tylenol)  650 mg  Q4H  PRN


 PO  6/7/18 16:15


     


 


 


  (Motrin)  800 mg  Q8H  PRN


 PO  6/7/18 16:15


    6/9/18 03:34


 


 


  (Percocet  5-325


 Mg)  1 tab  Q4H  PRN


 PO  6/7/18 16:15


    6/8/18 09:21


 


 


  (Percocet  5-325


 Mg)  2 tab  Q4H  PRN


 PO  6/7/18 16:15


    6/9/18 06:03


 


 


  (Americaine 20%


 Top Spr)  1 spray  Q4H  PRN


 TOPICAL  6/7/18 16:15


    6/7/18 20:54


 


 


  (Tucks Pads)  1 applic  QID  PRN


 TOPICAL  6/7/18 16:15


    6/7/18 20:54


 


 


  (Jennifer-Colace)  2 tab  Q12H  PRN


 PO  6/7/18 16:15


    6/9/18 00:03


 


 


  (Ambien)  5 mg  HS  PRN


 PO  6/7/18 21:00


     


 


 


  (Mag-Al Plus


 Susp Liq)  15 ml  Q8H  PRN


 PO  6/7/18 16:15


     


 


 


  (Zofran  Odt)  4 mg  Q6H  PRN


 PO  6/7/18 16:15


     


 











Assessment/Plan


Assessment and Plan


6/8/18


PPD 1


watch for Post partum depression





PPD 2





home today


counseled on PPD and asked to return in one week











Trish Moreno MD Jun 9, 2018 09:27